# Patient Record
Sex: MALE | Race: WHITE | NOT HISPANIC OR LATINO | Employment: STUDENT | URBAN - METROPOLITAN AREA
[De-identification: names, ages, dates, MRNs, and addresses within clinical notes are randomized per-mention and may not be internally consistent; named-entity substitution may affect disease eponyms.]

---

## 2021-09-30 ENCOUNTER — ATHLETIC TRAINING SESSION (OUTPATIENT)
Dept: SPORTS MEDICINE | Facility: CLINIC | Age: 21
End: 2021-09-30

## 2021-09-30 DIAGNOSIS — S69.82XA TFCC (TRIANGULAR FIBROCARTILAGE COMPLEX) INJURY, LEFT, INITIAL ENCOUNTER: Primary | ICD-10-CM

## 2021-09-30 RX ORDER — KETOROLAC TROMETHAMINE 10 MG/1
10 TABLET, FILM COATED ORAL EVERY 6 HOURS PRN
Qty: 14 TABLET | Refills: 0 | OUTPATIENT
Start: 2021-09-30 | End: 2021-10-01

## 2021-10-01 ENCOUNTER — TELEPHONE (OUTPATIENT)
Dept: SPORTS MEDICINE | Facility: CLINIC | Age: 21
End: 2021-10-01

## 2021-10-01 DIAGNOSIS — S69.80XA TFCC (TRIANGULAR FIBROCARTILAGE COMPLEX) INJURY: Primary | ICD-10-CM

## 2021-10-01 RX ORDER — KETOROLAC TROMETHAMINE 10 MG/1
10 TABLET, FILM COATED ORAL EVERY 6 HOURS
Qty: 20 TABLET | Refills: 0 | Status: CANCELLED | OUTPATIENT
Start: 2021-10-01 | End: 2021-10-06

## 2021-10-01 RX ORDER — KETOROLAC TROMETHAMINE 10 MG/1
10 TABLET, FILM COATED ORAL EVERY 6 HOURS PRN
Qty: 14 TABLET | Refills: 0 | Status: SHIPPED | OUTPATIENT
Start: 2021-10-01 | End: 2022-02-07

## 2021-10-18 ENCOUNTER — ATHLETIC TRAINING SESSION (OUTPATIENT)
Dept: SPORTS MEDICINE | Facility: CLINIC | Age: 21
End: 2021-10-18

## 2021-11-15 DIAGNOSIS — M25.532 LEFT WRIST PAIN: Primary | ICD-10-CM

## 2021-11-17 ENCOUNTER — APPOINTMENT (OUTPATIENT)
Dept: RADIOLOGY | Facility: OTHER | Age: 21
End: 2021-11-17
Attending: ORTHOPAEDIC SURGERY
Payer: COMMERCIAL

## 2021-11-17 ENCOUNTER — OFFICE VISIT (OUTPATIENT)
Dept: SPORTS MEDICINE | Facility: CLINIC | Age: 21
End: 2021-11-17
Payer: COMMERCIAL

## 2021-11-17 VITALS
BODY MASS INDEX: 23.84 KG/M2 | HEIGHT: 72 IN | WEIGHT: 176 LBS | SYSTOLIC BLOOD PRESSURE: 110 MMHG | DIASTOLIC BLOOD PRESSURE: 60 MMHG

## 2021-11-17 DIAGNOSIS — M25.532 LEFT WRIST PAIN: ICD-10-CM

## 2021-11-17 DIAGNOSIS — M25.532 PAIN IN LEFT WRIST: Primary | ICD-10-CM

## 2021-11-17 PROCEDURE — 73110 X-RAY EXAM OF WRIST: CPT | Mod: TC,LT

## 2021-11-17 PROCEDURE — 99204 PR OFFICE/OUTPT VISIT, NEW, LEVL IV, 45-59 MIN: ICD-10-PCS | Mod: 25,S$GLB,, | Performed by: ORTHOPAEDIC SURGERY

## 2021-11-17 PROCEDURE — 3008F PR BODY MASS INDEX (BMI) DOCUMENTED: ICD-10-PCS | Mod: CPTII,S$GLB,, | Performed by: ORTHOPAEDIC SURGERY

## 2021-11-17 PROCEDURE — 97110 THERAPEUTIC EXERCISES: CPT | Mod: GP,S$GLB,, | Performed by: ORTHOPAEDIC SURGERY

## 2021-11-17 PROCEDURE — 3074F PR MOST RECENT SYSTOLIC BLOOD PRESSURE < 130 MM HG: ICD-10-PCS | Mod: CPTII,S$GLB,, | Performed by: ORTHOPAEDIC SURGERY

## 2021-11-17 PROCEDURE — 97110 PR THERAPEUTIC EXERCISES: ICD-10-PCS | Mod: GP,S$GLB,, | Performed by: ORTHOPAEDIC SURGERY

## 2021-11-17 PROCEDURE — 1160F PR REVIEW ALL MEDS BY PRESCRIBER/CLIN PHARMACIST DOCUMENTED: ICD-10-PCS | Mod: CPTII,S$GLB,, | Performed by: ORTHOPAEDIC SURGERY

## 2021-11-17 PROCEDURE — 3078F DIAST BP <80 MM HG: CPT | Mod: CPTII,S$GLB,, | Performed by: ORTHOPAEDIC SURGERY

## 2021-11-17 PROCEDURE — 99204 OFFICE O/P NEW MOD 45 MIN: CPT | Mod: 25,S$GLB,, | Performed by: ORTHOPAEDIC SURGERY

## 2021-11-17 PROCEDURE — 3074F SYST BP LT 130 MM HG: CPT | Mod: CPTII,S$GLB,, | Performed by: ORTHOPAEDIC SURGERY

## 2021-11-17 PROCEDURE — 73110 XR WRIST COMPLETE 3 VIEWS LEFT: ICD-10-PCS | Mod: 26,LT,, | Performed by: INTERNAL MEDICINE

## 2021-11-17 PROCEDURE — 99999 PR PBB SHADOW E&M-EST. PATIENT-LVL III: CPT | Mod: PBBFAC,,, | Performed by: ORTHOPAEDIC SURGERY

## 2021-11-17 PROCEDURE — 99999 PR PBB SHADOW E&M-EST. PATIENT-LVL III: ICD-10-PCS | Mod: PBBFAC,,, | Performed by: ORTHOPAEDIC SURGERY

## 2021-11-17 PROCEDURE — 1159F PR MEDICATION LIST DOCUMENTED IN MEDICAL RECORD: ICD-10-PCS | Mod: CPTII,S$GLB,, | Performed by: ORTHOPAEDIC SURGERY

## 2021-11-17 PROCEDURE — 3008F BODY MASS INDEX DOCD: CPT | Mod: CPTII,S$GLB,, | Performed by: ORTHOPAEDIC SURGERY

## 2021-11-17 PROCEDURE — 3078F PR MOST RECENT DIASTOLIC BLOOD PRESSURE < 80 MM HG: ICD-10-PCS | Mod: CPTII,S$GLB,, | Performed by: ORTHOPAEDIC SURGERY

## 2021-11-17 PROCEDURE — 1159F MED LIST DOCD IN RCRD: CPT | Mod: CPTII,S$GLB,, | Performed by: ORTHOPAEDIC SURGERY

## 2021-11-17 PROCEDURE — 1160F RVW MEDS BY RX/DR IN RCRD: CPT | Mod: CPTII,S$GLB,, | Performed by: ORTHOPAEDIC SURGERY

## 2021-11-17 PROCEDURE — 73110 X-RAY EXAM OF WRIST: CPT | Mod: 26,LT,, | Performed by: INTERNAL MEDICINE

## 2021-12-17 ENCOUNTER — ATHLETIC TRAINING SESSION (OUTPATIENT)
Dept: SPORTS MEDICINE | Facility: CLINIC | Age: 21
End: 2021-12-17
Payer: COMMERCIAL

## 2022-02-04 ENCOUNTER — ATHLETIC TRAINING SESSION (OUTPATIENT)
Dept: SPORTS MEDICINE | Facility: CLINIC | Age: 22
End: 2022-02-04
Payer: COMMERCIAL

## 2022-02-04 NOTE — PROGRESS NOTES
Dash completed therapeutic exercises to develop strength:    Subjective: nasra states that he no longer has pain. He reports that he still has some numbness in his pinky finger and wanted to know if he could start taking NSAIDS for it. Ath had PRP injection at home- I advised him to follow up with his treating physician to find out if he can take NSAIDS yet following the injection.   aht agreed to do so.  Objective:        Warm-Up Reps/Sets/Time Weight #                              Exercise Reps/Sets/Time Weight #   web 5'     Hammer 30x     Putty  3'     Finger ring ext 20x  green   Finger extension band 20x  red   Hand  with swing 20x  20#                                            Dash received the following  modalities and/or manual therapy techniques:      Modality/Manual Therapy Time   E-stim IFC    E-stim premod    Normatec      US     IASTM-    Cupping           Goals: decrease numbness, increase strength    Future appointment:  Every MWF @ 1030 am through March

## 2022-02-07 ENCOUNTER — OFFICE VISIT (OUTPATIENT)
Dept: SPORTS MEDICINE | Facility: CLINIC | Age: 22
End: 2022-02-07
Payer: COMMERCIAL

## 2022-02-07 VITALS
HEIGHT: 72 IN | WEIGHT: 176 LBS | SYSTOLIC BLOOD PRESSURE: 110 MMHG | BODY MASS INDEX: 23.84 KG/M2 | DIASTOLIC BLOOD PRESSURE: 78 MMHG

## 2022-02-07 DIAGNOSIS — Z86.16 HISTORY OF 2019 NOVEL CORONAVIRUS DISEASE (COVID-19): Primary | ICD-10-CM

## 2022-02-07 DIAGNOSIS — Z03.818 ENCOUNTER FOR OBSERVATION FOR SUSPECTED EXPOSURE TO OTHER BIOLOGICAL AGENTS RULED OUT: ICD-10-CM

## 2022-02-07 DIAGNOSIS — M99.07 UPPER EXTREMITY SOMATIC DYSFUNCTION: ICD-10-CM

## 2022-02-07 DIAGNOSIS — M25.532 ACUTE PAIN OF LEFT WRIST: ICD-10-CM

## 2022-02-07 DIAGNOSIS — Z86.19 PERSONAL HISTORY OF UNSPECIFIED INFECTIOUS AND PARASITIC DISEASE: ICD-10-CM

## 2022-02-07 PROCEDURE — 99999 PR PBB SHADOW E&M-EST. PATIENT-LVL II: ICD-10-PCS | Mod: PBBFAC,,, | Performed by: NEUROMUSCULOSKELETAL MEDICINE & OMM

## 2022-02-07 PROCEDURE — 98925 OSTEOPATH MANJ 1-2 REGIONS: CPT | Mod: S$GLB,,, | Performed by: NEUROMUSCULOSKELETAL MEDICINE & OMM

## 2022-02-07 PROCEDURE — 3074F PR MOST RECENT SYSTOLIC BLOOD PRESSURE < 130 MM HG: ICD-10-PCS | Mod: CPTII,S$GLB,, | Performed by: NEUROMUSCULOSKELETAL MEDICINE & OMM

## 2022-02-07 PROCEDURE — 99214 OFFICE O/P EST MOD 30 MIN: CPT | Mod: 25,S$GLB,, | Performed by: NEUROMUSCULOSKELETAL MEDICINE & OMM

## 2022-02-07 PROCEDURE — 3078F DIAST BP <80 MM HG: CPT | Mod: CPTII,S$GLB,, | Performed by: NEUROMUSCULOSKELETAL MEDICINE & OMM

## 2022-02-07 PROCEDURE — 98925 PR OSTEOPATHIC MANIP,1-2 BODY REGN: ICD-10-PCS | Mod: S$GLB,,, | Performed by: NEUROMUSCULOSKELETAL MEDICINE & OMM

## 2022-02-07 PROCEDURE — 3074F SYST BP LT 130 MM HG: CPT | Mod: CPTII,S$GLB,, | Performed by: NEUROMUSCULOSKELETAL MEDICINE & OMM

## 2022-02-07 PROCEDURE — 99999 PR PBB SHADOW E&M-EST. PATIENT-LVL II: CPT | Mod: PBBFAC,,, | Performed by: NEUROMUSCULOSKELETAL MEDICINE & OMM

## 2022-02-07 PROCEDURE — 3008F BODY MASS INDEX DOCD: CPT | Mod: CPTII,S$GLB,, | Performed by: NEUROMUSCULOSKELETAL MEDICINE & OMM

## 2022-02-07 PROCEDURE — 1159F MED LIST DOCD IN RCRD: CPT | Mod: CPTII,S$GLB,, | Performed by: NEUROMUSCULOSKELETAL MEDICINE & OMM

## 2022-02-07 PROCEDURE — 1159F PR MEDICATION LIST DOCUMENTED IN MEDICAL RECORD: ICD-10-PCS | Mod: CPTII,S$GLB,, | Performed by: NEUROMUSCULOSKELETAL MEDICINE & OMM

## 2022-02-07 PROCEDURE — 1160F PR REVIEW ALL MEDS BY PRESCRIBER/CLIN PHARMACIST DOCUMENTED: ICD-10-PCS | Mod: CPTII,S$GLB,, | Performed by: NEUROMUSCULOSKELETAL MEDICINE & OMM

## 2022-02-07 PROCEDURE — 1160F RVW MEDS BY RX/DR IN RCRD: CPT | Mod: CPTII,S$GLB,, | Performed by: NEUROMUSCULOSKELETAL MEDICINE & OMM

## 2022-02-07 PROCEDURE — 3008F PR BODY MASS INDEX (BMI) DOCUMENTED: ICD-10-PCS | Mod: CPTII,S$GLB,, | Performed by: NEUROMUSCULOSKELETAL MEDICINE & OMM

## 2022-02-07 PROCEDURE — 99214 PR OFFICE/OUTPT VISIT, EST, LEVL IV, 30-39 MIN: ICD-10-PCS | Mod: 25,S$GLB,, | Performed by: NEUROMUSCULOSKELETAL MEDICINE & OMM

## 2022-02-07 PROCEDURE — 3078F PR MOST RECENT DIASTOLIC BLOOD PRESSURE < 80 MM HG: ICD-10-PCS | Mod: CPTII,S$GLB,, | Performed by: NEUROMUSCULOSKELETAL MEDICINE & OMM

## 2022-02-07 NOTE — PROGRESS NOTES
CC: cardiac evaluation following positive COVID-19 test      HPI: Pt reports positive Covid test prior to travel, as he had been exposed prior to visiting home in [facility]. Denies any symptoms at any time. Pt is vaccinated- Pfizer 3/22/21, 4/12/21, Moderna 12/9/21. Pt does report a hx of heart murmur that he monitored with an echocardiogram by his cardiologist in [facility] every 2-3 years.. He has been working out daily since his positive test with no symptoms or problems.     Of note, patient also notes increased, dull left wrist pain with golfing, more his dorsal wrist than in his prior injury at the [facility].  Patient notes that his CC pain has continued to improve. However, he does know some left small finger tingling with increased use of hand on occasion.  Patient is continuing to do hand therapy in training room.     Date of positive test: 12/18/22  Time in isolation: 10 days   Symptoms during viral course: none  Hospitalization required?: no        REVIEW OF SYSTEMS:   Constitution: Patient denies fever or chills.  Eyes: Patient denies eye pain or vision changes.  CVS: Patient denies chest pain.  Lungs: Patient denies shortness of breath or cough.  Abdomen: Patient denies any stomach pain, nausea, vomiting, or diarrhea  Skin: Patient denies skin rash or itching.    Musculoskeletal: Patient denies recent injuries or trauma.  Neuro: Patient denies any numbness or tingling in upper or lower extremities.  Psych: Patient denies any current anxiety or nervousness.     PAST MEDICAL HISTORY:   History reviewed. No pertinent past medical history.    PAST SURGICAL HISTORY:  History reviewed. No pertinent surgical history.     FAMILY HISTORY:  History reviewed. No pertinent family history.    SOCIAL HISTORY:  Social Connections: Not on file     MEDICATIONS:   No current outpatient medications on file.    ALLERGIES:   Review of patient's allergies indicates:  No Known Allergies     PHYSICAL EXAMINATION:  Vitals:    02/07/22  1305   BP: 110/78     Vitals signs and nursing note have been reviewed.  General: In no acute distress, well developed, well nourished, no diaphoresis  Eyes: EOM full and smooth, no eye redness or discharge  HENT: normocephalic and atraumatic, neck supple, trachea midline, no nasal discharge  Cardiovascular:  Regular rate and rhythm, split S1 murmur noted,  no thrills, no JVD, no LE edema, bilateral and symmetric 2+ DP and radial pulses bilaterally  Lungs: respirations non-labored, no conversational dyspnea, CTABL, no wheezing, no rhonchi  Neuro: AAOx3, CN2-12 grossly intact  Skin: No rashes, warm and dry  Psychiatric: cooperative, pleasant, mood and affect appropriate for age    Musculoskeletal system:    Wrist: left WRIST  The affected wrist is compared to the contralateral wrist.    Observation:  No evidence of edema, erythema, ecchymosis, or effusion.  No asymmetries; muscle atrophy; ganglion cysts; or bony abnormalities including ulnar deviation,   No Heberdens or Brouchards nodes,   No swan-neck or boutonnière deformities.    No clubbing of the digits.    Tenderness:  No tenderness at radial styloid, Dannielle's tubercle, ulnar styloid.  No tenderness at anatomic snuff box, scaphoid tubercle, scapholunate junction.  No tenderness at TFCC  No tenderness at pisiform, hamate, metacarpals and phalanges.  No tenderness over median nerve at the carpal tunnel.    Range of Motion(* = with pain):  Active wrist extension to 70° on left and 70° on right* (improved following OMT) .   Active wrist flexion to 80°on left and 80° on right.   Active radial deviation to 20° on left and 20° on right.    Active ulnar deviation to 30° on left and 30° on right.   Active pronation to 80° on left and 80° on right.   Active supination to 80° on left and 80° on right.   Full active flexion and extension at the MCP, PIP, and DIP bilaterally.   Active thumb motion full in all planes.    Strength Testing (* = with pain):    Wrist extension  - 5/5 on left and 5/5 on right  Wrist flexion - 5/5 on left and 5/5 on right  Ulnar deviation - 5/5 on left and 5/5 on right  Radial deviation - 5/5 on left and 5/5 on right  Pronation - 5/5 on left and 5/5 on right  Supination - 5/5 on left and 5/5 on right    Finger flexion - 5/5 on left and 5/5 on right  Finger extension - 5/5 on left and 5/5 on right  Finger abduction - 5/5 on left and 5/5 on right  Finger adduction - 5/5 on left and 5/5 on right    Thumb flexion - 5/5 on left and 5/5 on right  Thumb extension - 5/5 on left and 5/5 on right  Thumb abduction - 5/5 on left and 5/5 on right  Thumb adduction - 5/5 on left and 5/5 on right  Thumb opposition - 5/5 on left and 5/5 on right    Special Tests:  Tinel's test at wrist - negative    Tinel's test of Guyon's canal - negative    No laxity with distal radioulnar joint translation.  Negative Dominguezs test.     No laxity with phalangeal varus/valgus stress testing.      Axial grind of first CMC joint - negative  No laxity at the MCP UCL of the thumb    Normal fist alignment of the phalanges  No laxity at the RCL and UCL of the PIPs and DIPs of the phalanges.  Normal finger flexion of the FDP and FDS in all phalanges bilaterally.  Able to perform OK sign.    TART (Tissue texture abnormality, Asymmetry,  Restriction of motion and/or Tenderness) changes:    Upper extremity:    Region Finding/resrtiction   SC joint Neutral   AC joint Neutral   GH joint Neutral   Radial head Posterior on Left   Ulna Neutral   Distal Radiocapral Left   DRUJ Neutral   TFCC Neutral   Proximal carpal row Left   Distal carpal row Left   2nd and 3rd Carpal-metacarpal  Left   2nd and 3rd Metacarapal  Left       Neurovascular Exam:  Sensation intact to light touch in the median, ulnar, and radial distributions on the hands bilaterally.  Radial and ulnar pulses intact and symmetric.  Capillary refill intact to <2 seconds in all digits.         ASSESSMENT:    1. History of 2019 novel coronavirus  disease (COVID-19)    2. Personal history of unspecified infectious and parasitic disease    3. Encounter for observation for suspected exposure to other biological agents ruled out    4. Acute pain of left wrist    5. Upper extremity somatic dysfunction           PLAN:  1. Patient previously tested positive for COVID-19 on 12/18/22 and is here to obtain cardiac clearance prior to engaging in athletic activity due to possibility of myocarditis.  During the course of his/her current COVID-19 infection, patient experienced no symptoms.  Patient has a history of a valve issue, monitored by his cardiologist in [facility] with an echocardiogram every 2-3 years.  Split S1 heart sounds auscultated today is unchanged from baseline sports participation physical exam.  Recommend patient send over cardiology notes and most recent echocardiogram results to AT at [place] for our records.  - At this time the patient is completely asymptomatic and there are noexam findings concerning for myocarditis. In my opinion it is safe for the patient to start progressing weight lifting and cardiovascular training under the supervision of the athletic training staff. Education on myocarditis provided today.  - AT has been informed and is on board with the plan.  -  If symptoms exacerbate during return to activity, consider referral to cardiology, possibly more cardiac testing and labs    2. Acute left wrist pain likely secondary to biomechanical restrictions of the upper kinetic chain.  Patient has a history of a TFCC injury, however there is no irritation in this area on exam today.  - recommend continuing hand physical therapy with AT  - OMT performed today to address associated biomechanical restrictions      3. OMT 1-2 regions. Oral consent obtained.  Reviewed benefits and potential side effects.   - OMT indicated today due to signs and symptoms as well as local and remote somatic dysfunction findings and their related neurokinetic,  lymphatic, fascial and/or arteriovenous body connections.   - OMT techniques used: Muscle Energy and Articulatory   - Treatment was tolerated well. Improvement noted in segmental mobility post-treatment in dysfunctional regions. There were no adverse events and no complications immediately following treatment.     4. Follow up as needed for above. I will remain in close contact with the  to insure no symptoms occur when returning to golf activity with Agustina.    5. All questions were answered to the best of my ability and all concerns were addressed at this time.     This note is dictated using the M*Modal Fluency Direct word recognition program. There are word recognition mistakes that are occasionally missed on review.

## 2022-02-09 ENCOUNTER — ATHLETIC TRAINING SESSION (OUTPATIENT)
Dept: SPORTS MEDICINE | Facility: CLINIC | Age: 22
End: 2022-02-09
Payer: COMMERCIAL

## 2022-02-09 NOTE — PROGRESS NOTES
Subjective:          Chief Complaint: Dash Hess is a 21 y.o. male who had concerns including Injury and Numbness of the Left Wrist (Weakness).    Encounter Date: 2/8/2022  Pt stated he was having numbness and weakness throughout his Left hand. Pt explain he was playing tennis for the last 3 days and then completed work for about 6-7 hours at his adjusted (ergonomic desk) to decrease neck pain (forearms sit on edge of desk) and began to feel numbness and weakness in his left hand. Pt felt better after he kept his hand moving and using a stress ball to squeeze his strength returned. Then he fell asleep and woke up with numbness and weakness in his hand again. (Pt sleeps on right and left side but woke up today on left side with arm extended under his pillow)      Review of Systems   All other systems reviewed and are negative.                  Objective:        General: Dash is well-developed, well-nourished, appears stated age, in no acute distress, alert and oriented to time, place and person.                 Right Hand/Wrist Exam   Right hand exam is normal.      Left Hand/Wrist Exam     Inspection   Scars: Wrist - present   Effusion: Wrist - present     Swelling   Wrist - The patient is swollen on the TFCC and ulnar collateral ligament.    Tenderness   The patient is tender to palpation of the ulnar area.     Range of Motion     Wrist   Extension: normal   Flexion: normal   Pronation: normal   Supination: normal   Abduction: normal  Adduction: normal    Finger Flexion   MP Thumb: normal   MP Index: normal   MP Middle: normal   MP Ring: normal   MP Little: normal   DIP Thumb: normal     Tests   Carpal Tunnel Compression Test: positive  Cubital Tunnel Compression Test: positive      Other     Sensory Exam  Median Distribution: abnormal  Ulnar Distribution: abnormal    Comments:  Harman's test normal.      Right Elbow Exam   Right elbow exam is normal.      Left Elbow Exam     Range of Motion   Extension:  normal   Flexion: normal   Pronation: normal   Supination: normal     Tests   Varus: negative  Valgus: negative  Tinel's sign (cubital tunnel): positive  Tennis Elbow: negative  Golfer's Elbow: negative  Radial Capitellar Grind: negative    Other   Sensation: decreased        Muscle Strength   Left Upper Extremity  Wrist extension: 5/5   Wrist flexion: 5/5   :  5/5   Index Finger: 5/5  Middle Finger: 5/5  Ring Finger: 5/5  Little Finger: 5/5 (Pt stated he feels weaker )  Thumb - APB: 5/5  Thumb - FPL: 5/5  Pinch Mechanism: 5/5  Elbow Pronation:  5/5   Elbow Supination:  5/5   Elbow Extension: 5/5  Elbow Flexion: 5/5    Vascular Exam       Capillary Refill  Left Hand: normal capillary refill              Assessment:       Carpel tunnel syndrome  Cubital tunnel syndrome      Plan:       Follow up with DARRELL Putnam.                    Patient questionnaires may have been collected.

## 2022-02-09 NOTE — PROGRESS NOTES
Dash completed therapeutic exercises to develop strength:    Subjective: ath states this date that his opposition strength is better than it was yesterday, but not as good as it was last week.  He is concerned that he has backtracked in his progress.      Warm-Up Reps/Sets/Time Weight #                              Exercise Reps/Sets/Time Weight #   web 5'     Hammer 30x     Putty  3'     Finger ring ext 20x  green   Finger extension band 20x  red   Hand  with swing 20x  20#                                            Dash received the following  modalities and/or manual therapy techniques:      Modality/Manual Therapy Time   E-stim IFC    E-stim premod  Left wrist 10'    Normatec      US     IASTM-    Cupping           Goals: decrease numbness, increase strength.     Future appointment:  Every MWF @ 1030 am through March   ath will see Dr. Zafar in ATF on 2/10/22    Caron Putnam, LAT, ATC, ROBERTO  Lead   [facility]  [facility]  [email or web address]

## 2022-02-10 ENCOUNTER — ATHLETIC TRAINING SESSION (OUTPATIENT)
Dept: SPORTS MEDICINE | Facility: CLINIC | Age: 22
End: 2022-02-10
Payer: COMMERCIAL

## 2022-02-10 NOTE — PROGRESS NOTES
Athletic Training Room Note 02/10/2022  [facility]    :Caron Putnam     Physician: Dr. Jose Zafar    ATHLETE DID NOT SHOW UP FOR THIS APPOINTMENT.        CC: wrist/hand pain, weakness, numbness.    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     HPI: ath has hx of TFCC injury.   He was previously evaluated by Dr. Zafar in the ATF, and sought further treatment when he went home over the holiday break.  ath started PT at home.     Physical Exam:  Na        Assessment: NA        Plan: NA    Medications - NA    Exercises - NA    Referrals - NA    Imaging - NA    Follow up - NA                   This note was completed in the presence of the physician noted above    This note is dictated using the M*Modal Fluency Direct word recognition program. There are word recognition mistakes that are occasionally missed on review.    Caron Putnam, LAT, ATC, ROBERTO  Lead   [facility] [place]  [facility]  [email or web address]

## 2022-02-14 ENCOUNTER — ATHLETIC TRAINING SESSION (OUTPATIENT)
Dept: SPORTS MEDICINE | Facility: CLINIC | Age: 22
End: 2022-02-14
Payer: COMMERCIAL

## 2022-02-14 NOTE — PROGRESS NOTES
Dash completed therapeutic exercises to develop strength:    Subjective: ath states that he does well during the week as far as numbness/strength, but struggles on the weekends and Mondays. By Monday his numbness and weakness return after hours of computer work over the weekend.   I suggested taking breaks or not waiting until Sunday to complete all work.   ath stated he did not want to do do that.        Warm-Up Reps/Sets/Time Weight #                              Exercise Reps/Sets/Time Weight #   web 5'     Hammer 30x     Putty  3'     Finger ring ext 20x  green   Finger extension band 20x  red   Hand  with swing 20x  20#                                            Dash received the following  modalities and/or manual therapy techniques:      Modality/Manual Therapy Time   E-stim IFC    E-stim premod  Left wrist 10'    Normatec      US     IASTM-    Cupping           Goals: decrease numbness, increase strength.     Future appointment:  Every MWF @ 1030 am through March   ath will see Dr. Zafar in ATF on 2/17/22    Caron Putnam, LAT, ATC, ROBERTO  Lead   [facility]  [email or web address]

## 2022-02-16 ENCOUNTER — ATHLETIC TRAINING SESSION (OUTPATIENT)
Dept: SPORTS MEDICINE | Facility: CLINIC | Age: 22
End: 2022-02-16
Payer: COMMERCIAL

## 2022-02-16 NOTE — PROGRESS NOTES
Dash completed therapeutic exercises to develop strength:    Subjective: ath states this date that he played golf yesterday and had no pain, but did not have strength as compared to early January.  He also states that if he presses on his cubital tunnel he gets tingling in his pinky and ring finger. .        Warm-Up Reps/Sets/Time Weight #                              Exercise Reps/Sets/Time Weight #   web 5'      30x      3'      20x  green   Finger extension band 20x  red   Hand  with swing 20x  20#                                            Dash received the following  modalities and/or manual therapy techniques:      Modality/Manual Therapy Time   E-stim IFC    E-stim premod  Left wrist 10'    Normatec      US     IASTM- wrist flexors  10'    Cupping           Goals: decrease numbness, increase strength.     Future appointment:  Every MWF @ 1030 am through March   ath will see Dr. Zafar in ATF on 2/17/22    Caron Putnam, LAT, ATC, ROBERTO  Lead   [facility]  [email or web address]

## 2022-02-17 ENCOUNTER — ATHLETIC TRAINING SESSION (OUTPATIENT)
Dept: SPORTS MEDICINE | Facility: CLINIC | Age: 22
End: 2022-02-17
Payer: COMMERCIAL

## 2022-02-17 NOTE — PROGRESS NOTES
Athletic Training Room Note 02/17/2022  [facility]    :Caron Putnam     Physician: Dr. Jose Zafar        CC: wrist/hand pain, weakness, numbness.    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     HPI: ath has hx of TFCC injury.   He was previously evaluated by Dr. Zafar in the ATF, and sought further treatment when he went home over the holiday break.  ath started PT at home.       Physical Exam: Tinels Cubital tunnel positive   Carpal tunnel compression negative  Tinells carpal tunnel negative     MMT: ABD/ADD phalanges weakness 4/5        Assessment:  Cubital tunnel syndrome         Plan:  Adjust computer time, and sleep positions, keep watchful eye    Medications - anti-inflammatory meds - medrol dose pack     Exercises - ulnar nerve glides added to his therapy sessions in ATF.     Referrals - NA     Imaging - NA    Follow up - PRN                   This note was completed in the presence of the physician noted above    This note is dictated using the M*Modal Fluency Direct word recognition program. There are word recognition mistakes that are occasionally missed on review.    Caron Putnam, LAT, ATC, ROBERTO  Lead   [facility] [place]  [facility]  [email or web address]

## 2022-02-18 ENCOUNTER — ATHLETIC TRAINING SESSION (OUTPATIENT)
Dept: SPORTS MEDICINE | Facility: CLINIC | Age: 22
End: 2022-02-18
Payer: COMMERCIAL

## 2022-02-18 DIAGNOSIS — G56.22 ULNAR NEUROPATHY AT WRIST, LEFT: Primary | ICD-10-CM

## 2022-02-18 RX ORDER — METHYLPREDNISOLONE 4 MG/1
TABLET ORAL
Qty: 1 EACH | Refills: 0 | Status: SHIPPED | OUTPATIENT
Start: 2022-02-18

## 2022-02-18 NOTE — PROGRESS NOTES
"Dash completed therapeutic exercises to develop strength:    Subjective: ath unsure if he should take the nsaid Rx'd y Dr. Zafar during yesterdays evaluation        Warm-Up Reps/Sets/Time Weight #   Nerve glides "ok"  10x                           Exercise Reps/Sets/Time Weight #   web 5'          Putty  3'     Finger ring ext 20x  green   Finger extension band 20x  red   Hand  with swing 20x  20#                                            Dash received the following  modalities and/or manual therapy techniques:      Modality/Manual Therapy Time   E-stim IFC    E-stim premod  Left forearm  10'    Normatec      US     IASTM-    Cupping           Goals: decrease numbness, increase strength.     Future appointment:  Every MWF @ 1030 am through March     Caron Putnam, LAT, ATC, ROBERTO  Lead   [facility] [place]  [facility]  [email or web address]                "

## 2022-02-18 NOTE — PROGRESS NOTES
See ATR note from 02/17/2022    Medrol Dosepak prescribed to decrease inflammation around the irritated ulnar nerve.  Will follow-up in the ATR in 1-2 weeks if not improved.

## 2022-03-07 ENCOUNTER — ATHLETIC TRAINING SESSION (OUTPATIENT)
Dept: SPORTS MEDICINE | Facility: CLINIC | Age: 22
End: 2022-03-07
Payer: COMMERCIAL

## 2022-03-07 NOTE — PROGRESS NOTES
"Dash completed therapeutic exercises to develop strength:    Subjective: nasra states that he is feeling better, no pain.  Especially after a week off of school.          Warm-Up Reps/Sets/Time Weight #   Nerve glides "ok"  10x                           Exercise Reps/Sets/Time Weight #   web 5'          Putty  3'     Finger ring ext 20x  green   Finger extension band 20x  red   Hand  with swing 20x  20#                                            Dash received the following  modalities and/or manual therapy techniques:      Modality/Manual Therapy Time   E-stim IFC    E-stim premod  Left forearm  10'    Normatec      US     IASTM-L forearm 10'    Cupping           Goals: decrease numbness, increase strength.     Future appointment:  Every MWF @ 1030 am through March     Caron Putnam, LAT, ATC, ROBERTO  Lead   [facility] [place]  [facility]  [email or web address]                  "

## 2022-03-09 ENCOUNTER — ATHLETIC TRAINING SESSION (OUTPATIENT)
Dept: SPORTS MEDICINE | Facility: CLINIC | Age: 22
End: 2022-03-09
Payer: COMMERCIAL

## 2022-03-09 NOTE — PROGRESS NOTES
"Dash completed therapeutic exercises to develop strength:    Subjective: ath states that he is feeling better, no pain.     ath was late to appointment this day x 15 minutes.        Warm-Up Reps/Sets/Time Weight #   Nerve glides "ok"  10x                           Exercise Reps/Sets/Time Weight #   web 5'          Putty  3'     Finger ring ext 20x  green   Finger extension band 20x  red   Hand  with swing 20x  20#                                            Dash received the following  modalities and/or manual therapy techniques:      Modality/Manual Therapy Time   E-stim IFC    E-stim premod  Left forearm      Normatec           IASTM-L forearm     Cupping           Goals: decrease numbness, increase strength.     Future appointment:  Every MWF @ 1030 am through March     Caron Putnam, LAT, ATC, ROBERTO  Lead   [facility] [place]  [facility]  [email or web address]                    "

## 2022-03-15 ENCOUNTER — ATHLETIC TRAINING SESSION (OUTPATIENT)
Dept: SPORTS MEDICINE | Facility: CLINIC | Age: 22
End: 2022-03-15
Payer: COMMERCIAL

## 2022-03-15 NOTE — PROGRESS NOTES
"Dash completed therapeutic exercises to develop strength:    Subjective: ath states that he is feeling better, no pain.     ath was late to appointment this day x 15 minutes.        Warm-Up Reps/Sets/Time Weight #   Nerve glides "ok"  10x                           Exercise Reps/Sets/Time Weight #   web 5'          Putty  3'     Finger ring ext 20x  green   Finger extension band 20x  red   Hand  with swing 20x  20#                                            Dash received the following  modalities and/or manual therapy techniques:      Modality/Manual Therapy Time   E-stim IFC    E-stim premod  Left forearm  15'     Normatec      US     IASTM-L forearm 10'     Cupping           Goals: decrease numbness, increase strength.     Future appointment:  Every MWF @ 1030 am through March     Caron Putnam, LAT, ATC, ROBERTO  Lead   [facility] [place]  [facility]  [email or web address]                      "

## 2022-03-23 ENCOUNTER — ATHLETIC TRAINING SESSION (OUTPATIENT)
Dept: SPORTS MEDICINE | Facility: CLINIC | Age: 22
End: 2022-03-23
Payer: COMMERCIAL

## 2022-03-31 ENCOUNTER — ATHLETIC TRAINING SESSION (OUTPATIENT)
Dept: SPORTS MEDICINE | Facility: CLINIC | Age: 22
End: 2022-03-31
Payer: COMMERCIAL

## 2022-03-31 NOTE — PROGRESS NOTES
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    ATC administered IASTM  And stretching for the patient.    Patient Reports Relief of Symptoms After Treatment: Yes    Subjective: ath reports feeling "more loose" afterward.  ath also states that he has no wrist pain any more and it is "feeling great"             Caron Putnam, LAT, ATC, ROBERTO  Lead   [facility] [place] [facility]  [email or web address]      "

## 2022-03-31 NOTE — PROGRESS NOTES
Dash completed therapeutic exercises to develop strength:    Subjective: nasra states that he is feeling better, no pain.           Warm-Up Reps/Sets/Time Weight #                              Exercise Reps/Sets/Time Weight #   web 5'          Putty  3'     Finger ring ext 20x  green   Finger extension band 20x  red   Hand  with swing 20x  20#                                            Dash received the following  modalities and/or manual therapy techniques:      Modality/Manual Therapy Time   E-stim IFC    E-stim premod  Left forearm  15'     Normatec      US     IASTM-L forearm 10'     Cupping           Goals: decrease numbness, increase strength.     Future appointment:  Every MWF @ 1030 am through March     Caron Putnam, LAT, ATC, ROBERTO  Lead   [facility]  [facility]  [email or web address]

## 2022-04-01 ENCOUNTER — ATHLETIC TRAINING SESSION (OUTPATIENT)
Dept: SPORTS MEDICINE | Facility: CLINIC | Age: 22
End: 2022-04-01
Payer: COMMERCIAL

## 2022-04-01 NOTE — PROGRESS NOTES
aDsh completed therapeutic exercises to develop strength:    Subjective: ath states that he feels great. He hasnt had any relapses even though he has been spending a lot of time on his computer which was causing his pain before. He believes he is getting stronger           Warm-Up Reps/Sets/Time Weight #                              Exercise Reps/Sets/Time Weight #   web 5'          Putty  3'     Finger ring ext 20x  green   Finger extension band 20x  red   Hand  with swing 20x  20#                                            Dash received the following  modalities and/or manual therapy techniques:      Modality/Manual Therapy Time   E-stim IFC    E-stim premod  Left forearm  15'     Normatec      US     IASTM-L forearm 10'     Cupping           Goals: decrease numbness, increase strength.     Future appointment:  Every MWF @ 1030 am through March     VERONIQUE Perales, ATC     [employer] [facility]

## 2023-01-24 ENCOUNTER — TELEPHONE (OUTPATIENT)
Dept: SPORTS MEDICINE | Facility: CLINIC | Age: 23
End: 2023-01-24
Payer: COMMERCIAL

## 2023-01-24 NOTE — TELEPHONE ENCOUNTER
LVM asking the Pt to call so that we can discuss what he being seen for.  Provided call back number

## 2023-02-03 ENCOUNTER — TELEPHONE (OUTPATIENT)
Dept: RADIOLOGY | Facility: HOSPITAL | Age: 23
End: 2023-02-03
Payer: COMMERCIAL

## 2023-02-07 ENCOUNTER — HOSPITAL ENCOUNTER (OUTPATIENT)
Dept: RADIOLOGY | Facility: HOSPITAL | Age: 23
Discharge: HOME | End: 2023-02-07
Attending: NURSE PRACTITIONER
Payer: COMMERCIAL

## 2023-02-07 DIAGNOSIS — N63.20 BREAST MASS, LEFT: ICD-10-CM

## 2023-02-07 PROCEDURE — 77066 DX MAMMO INCL CAD BI: CPT | Mod: 26,,, | Performed by: RADIOLOGY

## 2023-02-07 PROCEDURE — 77062 BREAST TOMOSYNTHESIS BI: CPT | Mod: 26,,, | Performed by: RADIOLOGY

## 2023-02-07 PROCEDURE — 77062 MAMMO DIGITAL DIAGNOSTIC BILAT WITH TOMO: ICD-10-PCS | Mod: 26,,, | Performed by: RADIOLOGY

## 2023-02-07 PROCEDURE — 77066 DX MAMMO INCL CAD BI: CPT | Mod: TC

## 2023-02-07 PROCEDURE — 77066 MAMMO DIGITAL DIAGNOSTIC BILAT WITH TOMO: ICD-10-PCS | Mod: 26,,, | Performed by: RADIOLOGY

## 2023-07-21 ENCOUNTER — HOSPITAL ENCOUNTER (OUTPATIENT)
Dept: RADIOLOGY | Facility: HOSPITAL | Age: 23
Discharge: HOME | End: 2023-07-21
Attending: PHYSICIAN ASSISTANT
Payer: COMMERCIAL

## 2023-07-21 ENCOUNTER — TELEPHONE (OUTPATIENT)
Dept: SPORTS MEDICINE | Facility: CLINIC | Age: 23
End: 2023-07-21
Payer: COMMERCIAL

## 2023-07-21 ENCOUNTER — OFFICE VISIT (OUTPATIENT)
Dept: SPORTS MEDICINE | Facility: CLINIC | Age: 23
End: 2023-07-21
Payer: COMMERCIAL

## 2023-07-21 VITALS
WEIGHT: 175 LBS | BODY MASS INDEX: 23.73 KG/M2 | DIASTOLIC BLOOD PRESSURE: 46 MMHG | SYSTOLIC BLOOD PRESSURE: 106 MMHG | HEART RATE: 55 BPM

## 2023-07-21 DIAGNOSIS — M25.572 ACUTE LEFT ANKLE PAIN: Primary | ICD-10-CM

## 2023-07-21 DIAGNOSIS — S93.492A SPRAIN OF ANTERIOR TALOFIBULAR LIGAMENT OF LEFT ANKLE, INITIAL ENCOUNTER: ICD-10-CM

## 2023-07-21 DIAGNOSIS — S93.412A SPRAIN OF CALCANEOFIBULAR LIGAMENT OF LEFT ANKLE, INITIAL ENCOUNTER: ICD-10-CM

## 2023-07-21 DIAGNOSIS — M25.572 ACUTE LEFT ANKLE PAIN: ICD-10-CM

## 2023-07-21 PROCEDURE — 99213 OFFICE O/P EST LOW 20 MIN: CPT | Mod: S$GLB,,, | Performed by: PHYSICIAN ASSISTANT

## 2023-07-21 PROCEDURE — 73610 X-RAY EXAM OF ANKLE: CPT | Mod: TC,LT

## 2023-07-21 PROCEDURE — 99999 PR PBB SHADOW E&M-EST. PATIENT-LVL III: CPT | Mod: PBBFAC,,, | Performed by: PHYSICIAN ASSISTANT

## 2023-07-21 PROCEDURE — 99213 PR OFFICE/OUTPT VISIT, EST, LEVL III, 20-29 MIN: ICD-10-PCS | Mod: S$GLB,,, | Performed by: PHYSICIAN ASSISTANT

## 2023-07-21 PROCEDURE — 99999 PR PBB SHADOW E&M-EST. PATIENT-LVL III: ICD-10-PCS | Mod: PBBFAC,,, | Performed by: PHYSICIAN ASSISTANT

## 2023-07-21 PROCEDURE — 73610 X-RAY EXAM OF ANKLE: CPT | Mod: 26,LT,, | Performed by: RADIOLOGY

## 2023-07-21 PROCEDURE — 73610 XR ANKLE COMPLETE 3 VIEW LEFT: ICD-10-PCS | Mod: 26,LT,, | Performed by: RADIOLOGY

## 2023-07-21 NOTE — TELEPHONE ENCOUNTER
Lvm to let patient know [facility] will do an exam and then determine next steps----- Message from Maribell Crain sent at 7/21/2023 12:03 PM CDT -----  Regarding: Pt asking if MRI would be needed, if it could be done same day  Contact: @[phone]  Pt asking if MRI would be needed, if it could be done same day, appt scheduled for sprained ankle soft tissue affected @[phone]

## 2023-07-21 NOTE — PROGRESS NOTES
CC: Left ankle pain and swelling    Dash Hess is a 22 y.o. Male who presents today for initial evaluation of left ankle pain. 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  Patient reports that yesterday afternoon he was playing soccer when he sustained an inversion-type injury to the left ankle.  He immediately noticed pain and difficulty bearing weight followed by swelling and bruising that he noticed the following day.  He went to an urgent care today where x-rays were obtained which were negative for any acute fracture or dislocation and he was instructed follow up with Orthopedics.  He has been resting and icing the left ankle and taking over-the-counter anti-inflammatories/acetaminophen which seemed to help.  He has progressively been able to bear more weight today.  No prior injuries or surgeries on the left ankle.    Is affecting ADLs.       PAST MEDICAL HISTORY: History reviewed. No pertinent past medical history.  PAST SURGICAL HISTORY: History reviewed. No pertinent surgical history.  FAMILY HISTORY: History reviewed. No pertinent family history.  SOCIAL HISTORY:   Social History     Socioeconomic History    Marital status: Single   Tobacco Use    Smoking status: Never    Smokeless tobacco: Never       MEDICATIONS:   Current Outpatient Medications:     methylPREDNISolone (MEDROL DOSEPACK) 4 mg tablet, use as directed, Disp: 1 each, Rfl: 0  ALLERGIES: Review of patient's allergies indicates:  No Known Allergies    VITAL SIGNS: BP (!) 106/46   Pulse (!) 55   Wt 79.4 kg (175 lb)   BMI 23.73 kg/m²      Review of Systems   Constitution: Negative. Negative for chills, fever and night sweats.   HENT: Negative for congestion and headaches.    Eyes: Negative for blurred vision, left vision loss and right vision loss.   Cardiovascular: Negative for chest pain and syncope.   Respiratory: Negative for cough and shortness of breath.    Endocrine: Negative for polydipsia, polyphagia and polyuria.    Hematologic/Lymphatic: Negative for bleeding problem. Does not bruise/bleed easily.   Skin: Negative for dry skin, itching and rash.   Musculoskeletal: Negative for falls and muscle weakness.   Gastrointestinal: Negative for abdominal pain and bowel incontinence.   Genitourinary: Negative for bladder incontinence and nocturia.   Neurological: Negative for disturbances in coordination, loss of balance and seizures.   Psychiatric/Behavioral: Negative for depression. The patient does not have insomnia.    Allergic/Immunologic: Negative for hives and persistent infections.       PHYSICAL EXAMINATION    General:  The patient is alert and oriented x 3.  Mood is pleasant.  Observation of ears, eyes and nose reveal no gross abnormalities.  No labored breathing observed.    Left Foot and Ankle Exam    INSPECTION:      ALIGNMENT:  Gait:    Antalgic   Hindfoot  Normal    Scars:   None    Midfoot: Normal  Swelling:  moderate    Forefoot: Normal  Color:   Normal      Atrophy:  None    Collective Ankle-Hindfoot Alignment    Heel / Toe Walking: + difficulty   Good -plantigrade (PG), well aligned                TENDERNESS:  lATERAL:    anterior:  Sinus tarsi:  None  Anteromedial joint line:  none  Syndesmosis:  none  Anterolateral joint line:  none  ATFL:   +  Talonavicular:    none   CFL:   +  Anterior tibialis:   none  Anterolateral gutter: none  Extensor tendons:   none  Fibula:   none  Peroneal tendons: none  POSTERIOR:  Peroneal tubercle.  None  Medial/lateral achilles:   none       Medial/lateral achilles insertion: none  MEDIAL:      Deltoid:  none  CALCANEUS:  Malleolus:  none  Retrocalcaneal:   none  PTT:   none  Medial achilles:   none  Navicular:  none  Lateral achilles:   none       Calcaneal tuberosity:   none  FOOT:    Calcaneal cuboid  none MT / MT heads:  none   Navicular   none  Medial cord origin PF:  none  Cuneiforms:   none  Web space:   none  Lisfranc    none  Tarsal tunnel:   none  Base of the fifth  metatarsal  none Tinels sign   neg        RANGE OF MOTION:  RIGHT/ LEFT   STRENGTH: (affected)  Ankle DF/PF:  15/45  15/45    Anterior tibialis: 5/5     Eversion/Inversion: 15/25 15*/25*  Posterior tibialis: 5/5   Midfoot ABD/ADD: 10/10 10/10  Gastroc-soleus: 5/5   First MTP DF/PF: 60/25 60/25  Peroneals:  4/5*         EHL:   5/5   (* = pain)     FHL:   5/5         (* = pain)      SPECIAL TESTS:   ANKLE INSTABILITY: (*pain)    Anterior drawer:   Grade 1      (C-W contralateral side)     Inversion:   30°     Eversion  10°            Collective Instability: (Ant-post and varus-valgus)     Stable        PROVOCATIVE TESTING:    Forced DF/ER: No pain at syndesmosis.    Mid-leg squeeze  No pain at syndesmosis    Forced DF:  + pain anterior joint line.      Forced PF:  No pain posterior ankle.     Forced INV:  + pain present laterally    Forced EV:  No pain medial     Abdullahis sign: Normal ankle plantar flexion.     Resisted peroneal No subluxation or pain    1st-2nd MT toggle No pain at Lisfranc    MT-T torque  No pain at Lisfranc     NEUROLOGIC TESTING:  All dermatomes foot, ankle and leg have normal sensation light touch  Ankle Reflexes 2+, symmetric   Negative Babinski and No Clonus    VASCULAR:  2+ pulses PT/DT with brisk capillary refill toes.    Other Findings:  N/A    XRAYS left ankle (7/21/2023):  Left Ankle 3 views (AP, lateral,mortise)  were ordered and reviewed.   No evidence of any fracture or dislocation.  The osseous structures appear well mineralized and well aligned. No mortise displacement.    ASSESSMENT:   Left ankle pain  Left ankle sprain (ATFL/CFL)    PLAN:    I made the decision to obtain old records of the patient including previous notes and imaging. New imaging was ordered today of the extremity or extremities evaluated. I independently reviewed and interpreted the radiographs and/or MRIs today as well as prior imaging, if available.    We discussed at length different treatment options  including conservative vs surgical management. These include anti-inflammatories, acetaminophen, rest, ice, heat, formal physical therapy including strengthening and stretching exercises, home exercise programs, dry needling, boot wear/immobilization, and finally surgical intervention.      I offered patient a tall walking boot, however he declined stating that he has a lace-up ankle brace at home that we would like to wear instead.    Recommend he rest, ice, and elevate the left ankle and continue taking over-the-counter anti-inflammatories to help reduce pain/swelling.    Patient will follow-up as needed.  He is moving to [place] next Wednesday and will follow-up in [facility] if needed.      All questions were answered. Instructed patient to call with questions or concerns in the interim.       Medical Dictation software was used during the dictation of portions or the entirety of this medical record.  Phonetic or grammatic errors may exist due to the use of this software. For clarification, refer to the author of the document.

## 2023-07-24 ENCOUNTER — HOSPITAL ENCOUNTER (OUTPATIENT)
Dept: RADIOLOGY | Facility: HOSPITAL | Age: 23
Discharge: HOME | End: 2023-07-24
Attending: ORTHOPAEDIC SURGERY
Payer: COMMERCIAL

## 2023-07-24 ENCOUNTER — OFFICE VISIT (OUTPATIENT)
Dept: SPORTS MEDICINE | Facility: CLINIC | Age: 23
End: 2023-07-24
Payer: COMMERCIAL

## 2023-07-24 VITALS
DIASTOLIC BLOOD PRESSURE: 71 MMHG | HEART RATE: 63 BPM | WEIGHT: 186.63 LBS | BODY MASS INDEX: 25.31 KG/M2 | SYSTOLIC BLOOD PRESSURE: 119 MMHG

## 2023-07-24 DIAGNOSIS — M25.522 LEFT ELBOW PAIN: ICD-10-CM

## 2023-07-24 DIAGNOSIS — M77.8 TENDINITIS OF LEFT TRICEPS: Primary | ICD-10-CM

## 2023-07-24 PROCEDURE — 99999 PR PBB SHADOW E&M-EST. PATIENT-LVL III: ICD-10-PCS | Mod: PBBFAC,,, | Performed by: ORTHOPAEDIC SURGERY

## 2023-07-24 PROCEDURE — 76882 PR  US,EXTREMITY,NONVASCULAR,REAL-TIME IMAGE,LIMITED: ICD-10-PCS | Mod: S$GLB,,, | Performed by: ORTHOPAEDIC SURGERY

## 2023-07-24 PROCEDURE — 73080 XR ELBOW COMPLETE 3 VIEW LEFT: ICD-10-PCS | Mod: 26,LT,, | Performed by: RADIOLOGY

## 2023-07-24 PROCEDURE — 73080 X-RAY EXAM OF ELBOW: CPT | Mod: TC,LT

## 2023-07-24 PROCEDURE — 76882 US LMTD JT/FCL EVL NVASC XTR: CPT | Mod: S$GLB,,, | Performed by: ORTHOPAEDIC SURGERY

## 2023-07-24 PROCEDURE — 99999 PR PBB SHADOW E&M-EST. PATIENT-LVL III: CPT | Mod: PBBFAC,,, | Performed by: ORTHOPAEDIC SURGERY

## 2023-07-24 PROCEDURE — 73080 X-RAY EXAM OF ELBOW: CPT | Mod: 26,LT,, | Performed by: RADIOLOGY

## 2023-07-24 PROCEDURE — 99204 PR OFFICE/OUTPT VISIT, NEW, LEVL IV, 45-59 MIN: ICD-10-PCS | Mod: S$GLB,,, | Performed by: ORTHOPAEDIC SURGERY

## 2023-07-24 PROCEDURE — 99204 OFFICE O/P NEW MOD 45 MIN: CPT | Mod: S$GLB,,, | Performed by: ORTHOPAEDIC SURGERY

## 2023-07-24 RX ORDER — METHYLPREDNISOLONE 4 MG/1
TABLET ORAL
Qty: 21 EACH | Refills: 0 | Status: SHIPPED | OUTPATIENT
Start: 2023-07-24 | End: 2023-08-14

## 2023-07-24 NOTE — PROGRESS NOTES
NEW PATIENT    HISTORY OF PRESENT ILLNESS   22 y.o. Male with a history of left elbow pain who is a recent graduate at [employer]. He will be moving to [facility] in 2 days to start a job at a [email or web address]. He states he has had elbow pain for 5-6 months. He was told he had triceps tendinitis in the past, he was treated with rest and physical therapy. He did not have any relief. He has most pain while weight lifting. He has most pain while doing push exercises. He has mostly posterior elbow pain. He denies numbness and tingling.         Is affecting ADLs.  Pain is 0/10 at it's worst.        PAST MEDICAL HISTORY    History reviewed. No pertinent past medical history.    PAST SURGICAL HISTORY     History reviewed. No pertinent surgical history.    FAMILY HISTORY    History reviewed. No pertinent family history.    SOCIAL HISTORY    Social History     Socioeconomic History    Marital status: Single   Tobacco Use    Smoking status: Never    Smokeless tobacco: Never       MEDICATIONS      Current Outpatient Medications:     methylPREDNISolone (MEDROL DOSEPACK) 4 mg tablet, use as directed, Disp: 1 each, Rfl: 0    ALLERGIES     Review of patient's allergies indicates:  No Known Allergies      REVIEW OF SYSTEMS   Constitution: Negative. Negative for chills, fever and night sweats.   HENT: Negative for congestion and headaches.    Eyes: Negative for blurred vision, left vision loss and right vision loss.   Cardiovascular: Negative for chest pain and syncope.   Respiratory: Negative for cough and shortness of breath.    Endocrine: Negative for polydipsia, polyphagia and polyuria.   Hematologic/Lymphatic: Negative for bleeding problem. Does not bruise/bleed easily.   Skin: Negative for dry skin, itching and rash.   Musculoskeletal: Negative for falls. Positive for left elbow pain  Gastrointestinal: Negative for abdominal pain and bowel incontinence.   Genitourinary: Negative for bladder incontinence and nocturia.    Neurological: Negative for disturbances in coordination, loss of balance and seizures.   Psychiatric/Behavioral: Negative for depression. The patient does not have insomnia.    Allergic/Immunologic: Negative for hives and persistent infections.     PHYSICAL EXAMINATION    Vitals: /71   Pulse 63   Wt 84.6 kg (186 lb 9.9 oz)   BMI 25.31 kg/m²     General: The patient appears active and healthy with no apparent physical problems.  No disturbance of mood or affect is demonstrated. Alert and Oriented.    HEENT: Eyes normal, pupils equally round, nose normal.    Resp: Equal and symmetrical chest rises. No wheezing    CV: Regular rate    Neck: Supple; nonpainful range of motion.    Extremities: no cyanosis, clubbing, edema, or diffuse swelling.  Palpable pulses, good capillary refill of the digits.  No coolness, discoloration, edema or obvious varicosities.    Skin: no lesions noted.    Lymphatic: no detected adenopathy in the upper or lower extremities.    Neurologic: normal mental status, normal reflexes, normal gait and balance.  Patient is alert and oriented to person, place and time.  No flaccidity or spasticity is noted.  No motor or sensory deficits are noted.  Light touch is intact    Orthopaedic:     Elbow Exam-LEFT    Inspection: Normal skin color and appearance, no ecchymosis, no swelling, no scars.  There is a normal carrying angle.      ROM: 0° - 135+° with 80° supination and 80° pronation. Pain with flexion, resisted extension      Palpation: No tenderness at the medial epicondyle, olecranon, radial head, or lateral epicondyle. Tender lateral distal triceps insertion     The wrist flexor-pronator muscle group is nontender.    The wrist extensor mobile wad is nontender.    Strength: Flexor and extensor motor strength is 5/5.      Stability: Varus and valgus stress reveals no instability. No Guarding    Neuro Reflexes are 2/2 biceps, brachioradialis and triceps. Sensation intact    Test: - Milking  maneuver - VEO - Thinker's - Tinel's Sign - Ulnar nerve subluxation - Hook Test - Pain with resisted wrist ext - Pain with long finger ext      IMAGING    X-Ray Elbow Complete Left  EXAMINATION:  XR ELBOW COMPLETE 3 VIEW LEFT    CLINICAL HISTORY:  Pain in left elbow    FINDINGS:  Elbow complete three views left: No fracture dislocation bone destruction or joint effusion seen.    Electronically signed by: Dominic Holman MD  Date:    07/24/2023  Time:    09:20    PROCEDURE:  DIAGNOSTIC ULTRASOUND performed on 07/24/2023  FOCUSED:  1. Diagnostic Extremity - [facility]-Sports Ultrasound was recommended due to left elbow triceps tendon pain.  2. Diagnostic Extremity - [facility]Sports Ultrasound Performed: Kirit Sloan Extremity Study: left elbow    TECHNIQUE: Real time ultrasound examination of the left elbow was performed with SonUM Labste Edge 2, 9-L MHz linear probe(s).  Multiple long axis and short axis sonographic images of the elbow were taken.    FINDINGS: The images are of adequate diagnostic quality with identification of all echogenic structures made except for the vascular structures unless otherwise noted.     Evaluation of the insertion of the distal triceps tendon along the olecranon does not demonstrate any significant abnormalities to the triceps tendon.  Slight irregularity noted to the enthesis on the lateral side of the triceps tendon insertion.  Otherwise no evidence of any tendinosis or calcification within the tendon.      Ultrasound images were directly reviewed with the patient and then a treatment plan was discussed.      IMPRESSION       ICD-10-CM ICD-9-CM   1. Tendinitis of left triceps  M77.8 726.39   2. Left elbow pain  M25.522 719.42       MEDICATIONS PRESCRIBED      Medrol dose pack    RECOMMENDATIONS     Activity modifications given to the patient today  Voltaren gel  Return to clinic as needed      All questions were answered, pt will contact us for questions or concerns in the interim.